# Patient Record
Sex: MALE | Race: WHITE | NOT HISPANIC OR LATINO | Employment: FULL TIME | URBAN - METROPOLITAN AREA
[De-identification: names, ages, dates, MRNs, and addresses within clinical notes are randomized per-mention and may not be internally consistent; named-entity substitution may affect disease eponyms.]

---

## 2018-05-01 VITALS
SYSTOLIC BLOOD PRESSURE: 155 MMHG | HEART RATE: 84 BPM | OXYGEN SATURATION: 97 % | TEMPERATURE: 99 F | WEIGHT: 210 LBS | DIASTOLIC BLOOD PRESSURE: 95 MMHG | HEIGHT: 66 IN | BODY MASS INDEX: 33.75 KG/M2 | RESPIRATION RATE: 18 BRPM

## 2018-05-01 PROCEDURE — 99283 EMERGENCY DEPT VISIT LOW MDM: CPT

## 2018-05-01 PROCEDURE — 99282 EMERGENCY DEPT VISIT SF MDM: CPT | Mod: ,,, | Performed by: EMERGENCY MEDICINE

## 2018-05-02 ENCOUNTER — HOSPITAL ENCOUNTER (EMERGENCY)
Facility: HOSPITAL | Age: 23
Discharge: HOME | End: 2018-05-02
Attending: EMERGENCY MEDICINE
Payer: MEDICAID

## 2018-05-02 DIAGNOSIS — S99.921A INJURY OF TOE ON RIGHT FOOT, INITIAL ENCOUNTER: Primary | ICD-10-CM

## 2018-05-02 NOTE — ED PROVIDER NOTES
Encounter Date: 5/1/2018    SCRIBE #1 NOTE: I, Arik Temple, am scribing for, and in the presence of, Dr. Sunny SIDHU       History     Chief Complaint   Patient presents with    Toe Injury     Pt states that he dropped a wooden pallet on his right toe about 2145. Pt states he walked here from [facility]. Pt c/o pain to right toe and loose toenail. Bleeding controlled at this time.     Time patient was seen by the provider: 12:57 AM    The patient is a 22 y.o. male with no co-morbidities who presents to the ED with a complaint of right hallux toenail pain of initial onset 3 hours ago. While he was at work, the pt was lowering a heavy pallet and accidentally lowered it down onto the edge of his right foot. Only the edge of the right hallux nail was injured during this accident and pt is presenting to the ED because the incident happened while at work. Pt's nail is slightly bent up and minimal dried blood is present on the edge of the nail. There are no associated symptoms. Pt denies pain at the germinal matrix nail ,right foot phalanx pain, and any other complaints at this time.     Pt states his last tetanus shot was last year.       The history is provided by the patient and medical records.     Review of patient's allergies indicates:   Allergen Reactions    Pcn [penicillins] Rash     History reviewed. No pertinent past medical history.  History reviewed. No pertinent surgical history.  No family history on file.  Social History   Substance Use Topics    Smoking status: Never Smoker    Smokeless tobacco: Not on file    Alcohol use Not on file     Review of Systems   Constitutional: Negative for fever.   HENT: Negative for sore throat.    Respiratory: Negative for shortness of breath.    Cardiovascular: Negative for chest pain.   Gastrointestinal: Negative for nausea.   Genitourinary: Negative for dysuria.   Musculoskeletal: Negative for back pain.        Denies right foot phalanx and germinal matrix nail pain.     Skin: Negative for rash.        Mild right foot hallux pain with some minimal dried blood present under toenail. Right hallux toenail is slightly bent up.    Neurological: Negative for weakness.   Hematological: Does not bruise/bleed easily.       Physical Exam     Initial Vitals [05/01/18 2248]   BP Pulse Resp Temp SpO2   (!) 155/95 84 18 99 °F (37.2 °C) 97 %      MAP       115         Physical Exam    Nursing note and vitals reviewed.    Appearance: No acute distress.  Skin: No rashes seen.  Good turgor.  No abrasions.  No ecchymoses.  Eyes: No conjunctival injection.  ENT: Oropharynx clear.    Chest: Clear to auscultation bilaterally.  Good air movement.  No wheezes.  No rhonchi.  Cardiovascular: Regular rate and rhythm.  No murmurs. No gallops. No rubs.  Abdomen: Soft.  Not distended.  Nontender.  No guarding.  No rebound. No Masses  Musculoskeletal: Good range of motion all joints.  Neck supple.  No meningismus.  Top of the toenail is slightly upturned with dried blood at base.  No tenderness.  Full ROM of toe with no nail bed laceration.   Neurologic: Motor intact.  Sensation intact.  Cerebellar intact.  Cranial nerves intact.  Mental Status:  Alert and oriented x 3.  Appropriate, conversant.      ED Course   Procedures  Labs Reviewed - No data to display          Medical Decision Making:   History:   Old Medical Records: I decided to obtain old medical records.  ED Management:  Pt here with minor toe injury. No laceration repair required. Pt is hemodynamically stable and neurovascularly intact. Will discharge home.    Advised pt to follow up with PCP or return if concerning symptoms arise. Pt understands and agrees with plan. Will d/c home.                Scribe Attestation:   Scribe #1: I performed the above scribed service and the documentation accurately describes the services I performed. I attest to the accuracy of the note.               Clinical Impression:   The encounter diagnosis was Injury of toe  on right foot, initial encounter.    Disposition:   Disposition: Discharged  Condition: Stable                        William Correa MD  05/02/18 0430